# Patient Record
Sex: MALE | Race: WHITE
[De-identification: names, ages, dates, MRNs, and addresses within clinical notes are randomized per-mention and may not be internally consistent; named-entity substitution may affect disease eponyms.]

---

## 2022-11-04 ENCOUNTER — HOSPITAL ENCOUNTER (EMERGENCY)
Dept: HOSPITAL 75 - ER | Age: 37
Discharge: HOME | End: 2022-11-04
Payer: COMMERCIAL

## 2022-11-04 VITALS — WEIGHT: 313.94 LBS | HEIGHT: 74.02 IN | BODY MASS INDEX: 40.29 KG/M2

## 2022-11-04 VITALS — SYSTOLIC BLOOD PRESSURE: 141 MMHG | DIASTOLIC BLOOD PRESSURE: 83 MMHG

## 2022-11-04 DIAGNOSIS — L24.5: Primary | ICD-10-CM

## 2022-11-04 PROCEDURE — 99281 EMR DPT VST MAYX REQ PHY/QHP: CPT

## 2022-11-04 NOTE — ED EENT
History of Present Illness


General


Chief Complaint:  Eye Problems


Stated Complaint:  BUG POISON IN LT EYE,BURNING


Nursing Triage Note:  


PT AMBULATE TO ROOM 02 WITHOUT DIFFICULTY WITH C/O BILAT BURNING UNDER EYES. PT 


STATES HE WAS SPRAYING BUG KILLER AT 1100 TODAY AND HIS EYE STARTED BURNING AT 


1500 TODAY. PT REPORTS FLUSHING EYES WITH WATER AT HOME.


Source:  patient


Exam Limitations:  no limitations





History of Present Illness


Date Seen by Provider:  Nov 4, 2022


Time Seen by Provider:  17:30


Initial Comments


Patient is a 37-year-old male who presents to the emergency department today 

with a chief complaint of exposure to a "bug spray" at approximately 11 AM.  He 

states that he feels like he may have touched his eyes with fingers that were 

contaminated with the bug spray.  Approximate 4 hours later he noted a "burning 

sensation" under both eyes and at the corner of his eyes.  He states he flushed 

his eyes with water.  He denies any redness to the eyes or blurry vision or 

other visual acuity abnormalities.  No current eye pain.  He states the burning 

sensation is improving.





All other review of systems reviewed and negative except as stated


Timing/Duration:  abrupt (AT 3pm)


Severity:  mild


Location:  eye (R), eye (L)


Prearrival Treatment:  flushing eyes (with water)


Associated Symptoms:  denies symptoms





Allergies and Home Medications


Allergies


Coded Allergies:  


     deflazacort (Verified  Allergy, Unknown, 11/4/22)





Patient Home Medication List


Home Medication List Reviewed:  Yes


Cyclobenzaprine Hcl (Cyclobenzaprine Hcl) 10 Mg Tablet, 1 EACH PO Q8HR PRN


   Prescribed by: CLAUDE SALAZAR on 11/9/10 1857


Naproxen (Naprosyn) 500 Mg Tablet, 1 EACH PO TID PRN


   Prescribed by: CLAUDE SALAZAR on 11/9/10 1857





Review of Systems


Review of Systems


Constitutional:  see HPI


Eyes:  Denies Blurred Vision, Denies Drainage, Denies Decreased Acuity, Denies 

Pain; Other ("numbness under eyes)


Ears:  No Symptoms Reported


Respiratory:  no symptoms reported


Gastrointestinal:  no symptoms reported


Skin:  other ("burning" at corners of eyes)





All Other Systems Reviewed


Negative Unless Noted:  Yes





Past Medical-Social-Family Hx


Patient Social History


Tobacco Use?:  No


Smoking Status:  Never a Smoker


Smokeless Tobacco Frequency:  Never a User


Use of E-Cig and/or Vaping dev:  No


Use of E-Cig and/or Vaping Reymundo:  Never a User


Substance use?:  No


Alcohol Use?:  Yes


Alcohol Frequency:  Rarely


Pt feels they are or have been:  No





Immunizations Up To Date


COVID19 Vaccine :  MODERNA





Physical Exam


Vital Signs





Vital Signs - First Documented








 11/4/22 11/4/22





 16:18 17:53


 


Temp 36.2 


 


Pulse 76 


 


Resp 16 


 


B/P (MAP) 161/93 (115) 


 


Pulse Ox  98


 


O2 Delivery Room Air 








Height, Weight, BMI


Height: '"


Weight: lbs. oz. kg; 40.00 BMI


Method:


General Appearance:  WD/WN, no apparent distress


Eyes:  bilateral eye normal inspection, bilateral eye PERRL, bilateral eye EOMI


Neck:  normal inspection


Cardiovascular:  regular rate, rhythm


Respiratory:  no respiratory distress, no accessory muscle use


Neurologic/Psychiatric:  alert, normal mood/affect, oriented x 3


Skin:  normal color, warm/dry





Progress/Results/Core Measures


Results/Orders


My Orders





Orders - ALFONSO GRIMALDO MD


Dipht,Pertuss(Acell),Tet Adult (Boostrix (11/4/22 17:45)





Vital Signs/I&O











Blood Pressure Mean:                    115











Departure


Impression





   Primary Impression:  


   Irritant contact dermatitis due to chemical


Disposition:  01 HOME, SELF-CARE


Condition:  Stable





Departure-Patient Inst.


Decision time for Depature:  17:45


Referrals:  


[facility] [place]





ROSALINDA,LOCAL PHYSICIAN (PCP)


Primary Care Physician


Patient Instructions:  Chemical Exposure to the Skin ED





Add. Discharge Instructions:  


You can use saline wetting drops if your eyes become irritated.





If you wake up with some matting of your eyelids or redness of the whites of 

your eyes tomorrow you need to come back to the emergency room for reevaluation.





Return to the emergency room for any new, concerning or emergent complaints











ALFONSO GRIMALDO MD          Nov 4, 2022 17:44